# Patient Record
Sex: MALE | Race: WHITE | NOT HISPANIC OR LATINO
[De-identification: names, ages, dates, MRNs, and addresses within clinical notes are randomized per-mention and may not be internally consistent; named-entity substitution may affect disease eponyms.]

---

## 2017-05-01 ENCOUNTER — LAB SERVICES (OUTPATIENT)
Dept: OTHER | Age: 37
End: 2017-05-01

## 2017-05-01 LAB
KETONES: NEGATIVE
Lab: <0.01 G/DL
SALICYLATE LEVEL: <1 MG/DL

## 2017-05-02 ENCOUNTER — CHARTING TRANS (OUTPATIENT)
Dept: OTHER | Age: 37
End: 2017-05-02

## 2017-05-03 ENCOUNTER — CHARTING TRANS (OUTPATIENT)
Dept: OTHER | Age: 37
End: 2017-05-03

## 2017-05-04 ENCOUNTER — LAB SERVICES (OUTPATIENT)
Dept: OTHER | Age: 37
End: 2017-05-04

## 2017-05-05 ENCOUNTER — CHARTING TRANS (OUTPATIENT)
Dept: OTHER | Age: 37
End: 2017-05-05

## 2017-05-06 ENCOUNTER — CHARTING TRANS (OUTPATIENT)
Dept: OTHER | Age: 37
End: 2017-05-06

## 2017-05-06 ENCOUNTER — LAB SERVICES (OUTPATIENT)
Dept: OTHER | Age: 37
End: 2017-05-06

## 2017-05-07 ENCOUNTER — CHARTING TRANS (OUTPATIENT)
Dept: OTHER | Age: 37
End: 2017-05-07

## 2017-05-07 LAB — CLOSTRIDIUM DIFFICILE TOXIN PCR: NORMAL

## 2017-05-08 LAB
CULTURE CSF WITH GRAM: NORMAL
CULTURE STOOL: NORMAL

## 2017-05-10 LAB — APPEARANCE SPEC: NORMAL

## 2017-05-12 ENCOUNTER — NURSE ONLY (OUTPATIENT)
Dept: LAB | Age: 37
End: 2017-05-12
Attending: FAMILY MEDICINE
Payer: MEDICAID

## 2017-05-12 DIAGNOSIS — E87.6 HYPOKALEMIA: Primary | ICD-10-CM

## 2017-05-12 PROCEDURE — 80053 COMPREHEN METABOLIC PANEL: CPT

## 2017-05-12 PROCEDURE — 85025 COMPLETE CBC W/AUTO DIFF WBC: CPT

## 2017-05-12 PROCEDURE — 36415 COLL VENOUS BLD VENIPUNCTURE: CPT

## 2017-05-13 ENCOUNTER — LAB ENCOUNTER (OUTPATIENT)
Dept: LAB | Age: 37
End: 2017-05-13
Attending: FAMILY MEDICINE
Payer: MEDICAID

## 2017-05-13 ENCOUNTER — HOSPITAL ENCOUNTER (INPATIENT)
Facility: HOSPITAL | Age: 37
LOS: 5 days | Discharge: NURSING, REHAB OR LONG-TERM CARE | DRG: 392 | End: 2017-05-18
Attending: STUDENT IN AN ORGANIZED HEALTH CARE EDUCATION/TRAINING PROGRAM | Admitting: HOSPITALIST
Payer: MEDICAID

## 2017-05-13 ENCOUNTER — APPOINTMENT (OUTPATIENT)
Dept: CT IMAGING | Facility: HOSPITAL | Age: 37
DRG: 392 | End: 2017-05-13
Attending: STUDENT IN AN ORGANIZED HEALTH CARE EDUCATION/TRAINING PROGRAM
Payer: MEDICAID

## 2017-05-13 ENCOUNTER — APPOINTMENT (OUTPATIENT)
Dept: ULTRASOUND IMAGING | Facility: HOSPITAL | Age: 37
DRG: 392 | End: 2017-05-13
Attending: STUDENT IN AN ORGANIZED HEALTH CARE EDUCATION/TRAINING PROGRAM
Payer: MEDICAID

## 2017-05-13 DIAGNOSIS — D72.829 LEUKOCYTOSIS, UNSPECIFIED TYPE: ICD-10-CM

## 2017-05-13 DIAGNOSIS — R10.9 ABDOMINAL PAIN, ACUTE: Primary | ICD-10-CM

## 2017-05-13 DIAGNOSIS — G61.0 GUILLAIN BARRÉ SYNDROME (HCC): ICD-10-CM

## 2017-05-13 DIAGNOSIS — M79.2 NEUROPATHIC PAIN: ICD-10-CM

## 2017-05-13 DIAGNOSIS — R74.01 TRANSAMINITIS: ICD-10-CM

## 2017-05-13 DIAGNOSIS — I10 HTN (HYPERTENSION): Primary | ICD-10-CM

## 2017-05-13 DIAGNOSIS — R14.0 ABDOMINAL DISTENTION: ICD-10-CM

## 2017-05-13 DIAGNOSIS — R11.2 INTRACTABLE VOMITING WITH NAUSEA, UNSPECIFIED VOMITING TYPE: ICD-10-CM

## 2017-05-13 PROBLEM — N17.9 ACUTE KIDNEY INJURY (HCC): Status: ACTIVE | Noted: 2017-05-13

## 2017-05-13 PROBLEM — Z91.81 AT RISK FOR FALLING: Status: ACTIVE | Noted: 2017-05-13

## 2017-05-13 PROBLEM — E87.6 HYPOKALEMIA: Status: ACTIVE | Noted: 2017-05-13

## 2017-05-13 PROBLEM — R73.9 HYPERGLYCEMIA: Status: ACTIVE | Noted: 2017-05-13

## 2017-05-13 PROBLEM — D64.9 ANEMIA: Status: ACTIVE | Noted: 2017-05-13

## 2017-05-13 PROCEDURE — 36415 COLL VENOUS BLD VENIPUNCTURE: CPT

## 2017-05-13 PROCEDURE — 74177 CT ABD & PELVIS W/CONTRAST: CPT | Performed by: STUDENT IN AN ORGANIZED HEALTH CARE EDUCATION/TRAINING PROGRAM

## 2017-05-13 PROCEDURE — 85025 COMPLETE CBC W/AUTO DIFF WBC: CPT

## 2017-05-13 PROCEDURE — 80053 COMPREHEN METABOLIC PANEL: CPT

## 2017-05-13 PROCEDURE — 84300 ASSAY OF URINE SODIUM: CPT | Performed by: INTERNAL MEDICINE

## 2017-05-13 PROCEDURE — 76700 US EXAM ABDOM COMPLETE: CPT | Performed by: STUDENT IN AN ORGANIZED HEALTH CARE EDUCATION/TRAINING PROGRAM

## 2017-05-13 PROCEDURE — 99223 1ST HOSP IP/OBS HIGH 75: CPT | Performed by: INTERNAL MEDICINE

## 2017-05-13 RX ORDER — SODIUM CHLORIDE 9 MG/ML
INJECTION, SOLUTION INTRAVENOUS CONTINUOUS
Status: DISCONTINUED | OUTPATIENT
Start: 2017-05-13 | End: 2017-05-18

## 2017-05-13 RX ORDER — MORPHINE SULFATE 2 MG/ML
2 INJECTION, SOLUTION INTRAMUSCULAR; INTRAVENOUS EVERY 2 HOUR PRN
Status: DISCONTINUED | OUTPATIENT
Start: 2017-05-13 | End: 2017-05-17

## 2017-05-13 RX ORDER — DIPHENHYDRAMINE HYDROCHLORIDE 50 MG/ML
25 INJECTION INTRAMUSCULAR; INTRAVENOUS ONCE
Status: COMPLETED | OUTPATIENT
Start: 2017-05-13 | End: 2017-05-13

## 2017-05-13 RX ORDER — HYDROMORPHONE HYDROCHLORIDE 1 MG/ML
0.5 INJECTION, SOLUTION INTRAMUSCULAR; INTRAVENOUS; SUBCUTANEOUS ONCE
Status: COMPLETED | OUTPATIENT
Start: 2017-05-13 | End: 2017-05-13

## 2017-05-13 RX ORDER — METOCLOPRAMIDE HYDROCHLORIDE 5 MG/ML
10 INJECTION INTRAMUSCULAR; INTRAVENOUS ONCE
Status: COMPLETED | OUTPATIENT
Start: 2017-05-13 | End: 2017-05-13

## 2017-05-13 RX ORDER — MORPHINE SULFATE 4 MG/ML
4 INJECTION, SOLUTION INTRAMUSCULAR; INTRAVENOUS EVERY 2 HOUR PRN
Status: DISCONTINUED | OUTPATIENT
Start: 2017-05-13 | End: 2017-05-17

## 2017-05-13 RX ORDER — ONDANSETRON 2 MG/ML
4 INJECTION INTRAMUSCULAR; INTRAVENOUS EVERY 4 HOURS PRN
Status: CANCELLED | OUTPATIENT
Start: 2017-05-13

## 2017-05-13 RX ORDER — ONDANSETRON 2 MG/ML
4 INJECTION INTRAMUSCULAR; INTRAVENOUS ONCE
Status: COMPLETED | OUTPATIENT
Start: 2017-05-13 | End: 2017-05-13

## 2017-05-13 RX ORDER — GABAPENTIN 300 MG/1
600 CAPSULE ORAL 3 TIMES DAILY
Status: ON HOLD | COMMUNITY
End: 2017-05-18

## 2017-05-13 RX ORDER — MORPHINE SULFATE 4 MG/ML
4 INJECTION, SOLUTION INTRAMUSCULAR; INTRAVENOUS ONCE
Status: COMPLETED | OUTPATIENT
Start: 2017-05-13 | End: 2017-05-13

## 2017-05-13 RX ORDER — ONDANSETRON 2 MG/ML
4 INJECTION INTRAMUSCULAR; INTRAVENOUS EVERY 6 HOURS PRN
Status: DISCONTINUED | OUTPATIENT
Start: 2017-05-13 | End: 2017-05-18

## 2017-05-13 RX ORDER — SODIUM CHLORIDE 9 MG/ML
INJECTION, SOLUTION INTRAVENOUS CONTINUOUS
Status: CANCELLED | OUTPATIENT
Start: 2017-05-13 | End: 2017-05-13

## 2017-05-13 RX ORDER — MORPHINE SULFATE 2 MG/ML
1 INJECTION, SOLUTION INTRAMUSCULAR; INTRAVENOUS EVERY 2 HOUR PRN
Status: DISCONTINUED | OUTPATIENT
Start: 2017-05-13 | End: 2017-05-17

## 2017-05-13 RX ORDER — SODIUM CHLORIDE 9 MG/ML
1000 INJECTION, SOLUTION INTRAVENOUS ONCE
Status: COMPLETED | OUTPATIENT
Start: 2017-05-13 | End: 2017-05-13

## 2017-05-13 RX ORDER — HYDROCODONE BITARTRATE AND ACETAMINOPHEN 7.5; 325 MG/1; MG/1
1 TABLET ORAL EVERY 4 HOURS PRN
Status: ON HOLD | COMMUNITY
End: 2017-05-18

## 2017-05-13 RX ORDER — SIMETHICONE 80 MG
80 TABLET,CHEWABLE ORAL EVERY 6 HOURS PRN
Status: ON HOLD | COMMUNITY
End: 2017-05-18

## 2017-05-13 RX ORDER — ENOXAPARIN SODIUM 100 MG/ML
40 INJECTION SUBCUTANEOUS NIGHTLY
Status: DISCONTINUED | OUTPATIENT
Start: 2017-05-13 | End: 2017-05-18

## 2017-05-13 RX ORDER — NICOTINE 21 MG/24HR
PATCH, TRANSDERMAL 24 HOURS TRANSDERMAL EVERY 24 HOURS
Status: ON HOLD | COMMUNITY
End: 2017-05-18

## 2017-05-13 RX ORDER — HYDROMORPHONE HYDROCHLORIDE 1 MG/ML
0.5 INJECTION, SOLUTION INTRAMUSCULAR; INTRAVENOUS; SUBCUTANEOUS EVERY 30 MIN PRN
Status: CANCELLED | OUTPATIENT
Start: 2017-05-13 | End: 2017-05-13

## 2017-05-13 RX ORDER — FAMOTIDINE 20 MG/1
20 TABLET ORAL 2 TIMES DAILY
Status: ON HOLD | COMMUNITY
End: 2017-05-18

## 2017-05-13 NOTE — ED PROVIDER NOTES
Patient Seen in: [facility] Emergency Department    History   Patient presents with:  Abdomen/Flank Pain (GI/)    Stated Complaint: ABD PAIN/VOMITING    HPI    Patient is a 54-year-old male with previous history of Guillain-Barré syndrome currentl 1701 137   Resp 05/13/17 1701 24   Temp 05/13/17 1701 97 °F (36.1 °C)   Temp src 05/13/17 1701 Temporal   SpO2 05/13/17 1701 98 %   O2 Device 05/13/17 1701 None (Room air)       Current:/95 mmHg  Pulse 130  Temp(Src) 97 °F (36.1 °C) (Temporal)  Resp DIFFERENTIAL - Abnormal; Notable for the following:     WBC 19.5 (*)     RBC 3.21 (*)     HGB 11.2 (*)     HCT 32.2 (*)     .3 (*)     MCH 34.9 (*)     RDW-SD 51.3 (*)     Neutrophil Absolute Prelim 16.00 (*)     Neutrophil Absolute 16.00 (*)     [facility]    Disposition:  Admit    Follow-up:  No follow-up provider specified.     Medications Prescribed:  Current Discharge Medication List        Present on Admission  Date Reviewed: 10/12/2014          ICD-10-CM Noted POA    Abdominal pain, acute R10.9 5/

## 2017-05-14 PROBLEM — R29.898 BILATERAL LEG WEAKNESS: Status: ACTIVE | Noted: 2017-05-14

## 2017-05-14 PROCEDURE — 99223 1ST HOSP IP/OBS HIGH 75: CPT | Performed by: OTHER

## 2017-05-14 PROCEDURE — 30233S1 TRANSFUSION OF NONAUTOLOGOUS GLOBULIN INTO PERIPHERAL VEIN, PERCUTANEOUS APPROACH: ICD-10-PCS | Performed by: HOSPITALIST

## 2017-05-14 RX ORDER — POTASSIUM CHLORIDE 14.9 MG/ML
20 INJECTION INTRAVENOUS ONCE
Status: COMPLETED | OUTPATIENT
Start: 2017-05-14 | End: 2017-05-14

## 2017-05-14 RX ORDER — POTASSIUM CHLORIDE 14.9 MG/ML
20 INJECTION INTRAVENOUS ONCE
Status: COMPLETED | OUTPATIENT
Start: 2017-05-15 | End: 2017-05-15

## 2017-05-14 NOTE — CONSULTS
[facility]    Report of Consultation    [facility] Patient Status:  Inpatient    1980 MRN [id number]   [facility] 3NW-A Attending Tahira Valencia, [address] [facility] Day # 1 PCP No primary care provider on file.      Date of Admission: smokeless tobacco history on file. He reports that he uses illicit drugs (Cannabis) about 3 times per week.     Allergies:    Ibuprofen               Rash    Medications:    Current facility-administered medications:   •  potassium chloride 40 mEq in sodium Strength in UE intact almost 5/5. Strength in LE 2-3/5. DTR: absent throughout.  Co-ordination: severely impaired finger to nose test  Gait: deferred      Diagnostic Data:   Lab Results  Component Value Date    05/14/2017   K 2.8 05/14/2017    0

## 2017-05-14 NOTE — PROGRESS NOTES
Dr. Lashaun Vega paged for critical potassium. Waiting for response. Dr. Izzy Quach paged for critical potassium. Waiting for response.

## 2017-05-14 NOTE — PROGRESS NOTES
Dr. Rebecca Lemos paged for questions about orders. Waiting for response. 0007: Orders clarified: Reports to watch patient sip water to make sure patient can swallow and not cough-if able, may give ice chips.  Also reports to check a MRSA swab x3 24 hours apar

## 2017-05-14 NOTE — H&P
[facility]  History and Physical     [facility] Patient Status:  Inpatient    1980 MRN [id number]   [facility] 3NW-A Attending Robin Villalobos MD   Hosp Day # 1 PCP No primary care provider on file.      Chief Complain COLONOSCOPY & POLYPECTOMY         Social History:  reports that he has been smoking Cigarettes. He has been smoking about 0.50 packs per day. He does not have any smokeless tobacco history on file.  He reports that he uses illicit drugs (Cannabis) about 3 Labs:  Recent Labs   Lab  05/12/17   0830  05/13/17   0615  05/13/17   1708   WBC  9.7  14.2*  19.5*   HGB  10.2*  10.5*  11.2*   MCV  103.0*  103.0*  100.3*   PLT  183.0  242.0  252.0       Recent Labs   Lab  05/12/17   0830  05/13/17 0615 05/13/17

## 2017-05-14 NOTE — SLP NOTE
Order received for swallow eval.  Pt currently NPO except ice chips. RN, Leopoldo Holm reported pt tolerating ice chips without clinical s/s aspiration.   Due to NPO status, will await GI consult before proceeding with po trials/swallow eval.  RN verbalized agreeme

## 2017-05-14 NOTE — PLAN OF CARE
METABOLIC/FLUID AND ELECTROLYTES - ADULT    • Electrolytes maintained within normal limits Not Progressing          GASTROINTESTINAL - ADULT    • Minimal or absence of nausea and vomiting Progressing    • Maintains or returns to baseline bowel function Pro

## 2017-05-14 NOTE — PROGRESS NOTES
[facility]  Progress Note     Oriana Gaona Patient Status:  Inpatient    1980 MRN [id number]   [facility] 3NW-A Attending Blake Heard, [address] [facility] Day # 1 PCP No primary care provider on file.      Chief Complaint: N/V    S: hours. No results for input(s): TROP, CK in the last 72 hours. Imaging: Imaging data reviewed in Epic.     Medications:   • potassium chloride 40mEq IVPB (peripheral line)  40 mEq Intravenous Once    Followed by   • potassium chloride  20 mEq Int

## 2017-05-14 NOTE — PROGRESS NOTES
Dr. Octavia Garvey paged for new consult. Waiting for response    0710: Aware of consult. No new orders received.

## 2017-05-14 NOTE — PROGRESS NOTES
Lab paged for blood cultures to be drawn. Waiting for response. 2430: Lab in to draw blood cultures.

## 2017-05-14 NOTE — CONSULTS
[facility]    Gastroenterology Initial Consultation    [facility] Patient Status:  Inpatient    1980 MRN [id number]   [facility] 3NW-A Attending Tahira Valencia, [address] [facility] [address] Day # 1 PCP No primary care provider on file.        Ede Rodriguez week.    Allergies:    Ibuprofen               Rash    Medications:    Current facility-administered medications:   •  potassium chloride 40 mEq in sodium chloride 0.9 % 250 mL IVPB, 40 mEq, Intravenous, Once **FOLLOWED BY** potassium chloride IVPB premix excessive bleeding, enlarging or painful lymph nodes. Allergy: Denies latex/rubber allergy, anaphylactic or other reaction to anesthesia, food allergy. Eyes: Denies blurred/double vision, eye disease, glasses or contacts, glaucoma.   ENT: Denies nose or neurology  *Will trial clears however if cannot tolerate, will need to keep NPO  *Do not suspect leukocytosis to be related to bowel infection as findings are minimal on CT    Elevated Liver Enzymes  *Likely due to fatty liver as seen on US  *No further wo

## 2017-05-14 NOTE — PROGRESS NOTES
05/14/17 1459   Clinical Encounter Type   Visited With Patient not available   Continue Visiting Yes

## 2017-05-15 ENCOUNTER — APPOINTMENT (OUTPATIENT)
Dept: GENERAL RADIOLOGY | Facility: HOSPITAL | Age: 37
DRG: 392 | End: 2017-05-15
Attending: INTERNAL MEDICINE
Payer: MEDICAID

## 2017-05-15 PROCEDURE — 99233 SBSQ HOSP IP/OBS HIGH 50: CPT | Performed by: OTHER

## 2017-05-15 PROCEDURE — 99232 SBSQ HOSP IP/OBS MODERATE 35: CPT | Performed by: HOSPITALIST

## 2017-05-15 PROCEDURE — 05HD33Z INSERTION OF INFUSION DEVICE INTO RIGHT CEPHALIC VEIN, PERCUTANEOUS APPROACH: ICD-10-PCS | Performed by: HOSPITALIST

## 2017-05-15 PROCEDURE — B54MZZA ULTRASONOGRAPHY OF RIGHT UPPER EXTREMITY VEINS, GUIDANCE: ICD-10-PCS | Performed by: HOSPITALIST

## 2017-05-15 PROCEDURE — 74020 XR ABDOMEN, OBSTRUCTIVE SERIES (CPT=74020): CPT | Performed by: INTERNAL MEDICINE

## 2017-05-15 RX ORDER — POTASSIUM CHLORIDE 20 MEQ/1
40 TABLET, EXTENDED RELEASE ORAL ONCE
Status: COMPLETED | OUTPATIENT
Start: 2017-05-15 | End: 2017-05-15

## 2017-05-15 RX ORDER — SODIUM CHLORIDE 0.9 % (FLUSH) 0.9 %
10 SYRINGE (ML) INJECTION EVERY 12 HOURS
Status: DISCONTINUED | OUTPATIENT
Start: 2017-05-15 | End: 2017-05-18

## 2017-05-15 RX ORDER — FOLIC ACID 1 MG/1
1 TABLET ORAL DAILY
Status: DISCONTINUED | OUTPATIENT
Start: 2017-05-15 | End: 2017-05-17

## 2017-05-15 NOTE — OCCUPATIONAL THERAPY NOTE
OCCUPATIONAL THERAPY EVALUATION - INPATIENT     Room Number: 322/322-A  Evaluation Date: 5/15/2017  Type of Evaluation: Initial  Presenting Problem: abd pain/distension, leukocytosis, ileus and transaminitis     Physician Order: IP Consult to Occupational Leukocytosis    Acute kidney injury ([facility] [address]    Abdominal distention    Intractable vomiting with nausea, unspecified vomiting type    Leukocytosis, unspecified type    Transaminitis    Guillain Barré syndrome (HCC)    At risk for falling    Bilateral leg wea Modified Barthel Index score of 0/20; <15 usually indicates moderate disability; <10 usually indicates severe disability in ADL dysfunction in the areas of: bowel and bladder management, grooming, toileting, feeding, functional transfers, funct male admitted 5/13/2017 for abd pain/distension, leukocytosis, ileus and transaminitis.   In this OT evaluation patient presents with the following impairments: pain, numbness, BADL/IADL dysfunction, decreased endurance, balance, strength, ROM and functiona

## 2017-05-15 NOTE — PLAN OF CARE
GASTROINTESTINAL - ADULT    • Minimal or absence of nausea and vomiting Progressing    • Maintains or returns to baseline bowel function Progressing          METABOLIC/FLUID AND ELECTROLYTES - ADULT    • Electrolytes maintained within normal limits Progres

## 2017-05-15 NOTE — PROGRESS NOTES
[facility]  Progress Note     Robyn Lopez Patient Status:  Inpatient    1980 MRN [id number]   [facility] 3NW-A Attending Sabrina Silverio, [address] [facility] Day # 2 PCP No primary care provider on file.      Chief Complaint: N/V    S: Estimated Creatinine Clearance: 157.4 mL/min (based on Cr of 0.58). No results for input(s): PTP, INR in the last 72 hours. No results for input(s): TROP, CK in the last 72 hours. Imaging: Imaging data reviewed in Epic.     Medications:

## 2017-05-15 NOTE — CM/SW NOTE
VM message from Valeria Lance with [facility]. Pt admitted to [facility] from [facility]. Valeria Lance states she is aware pt is \"Homeless\" and the facility is willing to accept him back, if pt desires, when he is ready for discharge from [facility].      Valeria Lance states that pt w

## 2017-05-15 NOTE — PAYOR COMM NOTE
Attending Physician: Zenia Kuo DO    Review Type: ADMISSION   Reviewer: Shaniqua Redd       Date: May 15, 2017 - 8:57 AM  Payor: MEDICAID  Authorization Number: [id number]/A  Admit date: 5/13/2017  4:52 PM   Admitted from Emergency Dept.:yes     H&P by Gideon Syed, body feels numb from his shoulders down. His family also notes problems with coordination. He denies recent difficulty voiding, dysuria, headache. Denies constipation after discharge.  Mother says that he was vomiting for weeks prior to admission to [facility] deformity. Abdomen: Soft. Significantly distended. Nontender to palpation. No rebound, guarding or organomegaly. Hypoactive bowel sounds. Tympanic to percussion. Neurologic: CNII-XII grossly intact. RLE 4/5 strength, LLE 5/5, LUE 5/5, RUE 5/5.  Decreased CRP  4. Leukocytosis  1. UA with possible UTI although patient denying symptoms. Will order blood cultures. Follow up urine culture, will treat if positive. 5. Anemia  6. DERIC  1. Monitor creatinine with IV fluids  2. Urine Na  7.  Abnormal LFTs  1. RUQ US sodium chloride 0.9 % 250 mL IVPB     Date Action Dose Route User    5/14/2017 2300 New Bag 40 mEq Intravenous Lourdes Kaminski, RN          RESULTS LAST 24HRS:  Labs Reviewed   COMP METABOLIC PANEL (14) - Abnormal; Notable for the following:     Glucose 11 DIFFERENTIAL - Abnormal; Notable for the following:     WBC 14.2 (*)     RBC 2.54 (*)     HGB 8.9 (*)     HCT 26.2 (*)     .1 (*)     MCH 35.0 (*)     RDW-SD 53.0 (*)     Neutrophil Absolute Prelim 10.97 (*)     Neutrophil Absolute 10.97 (*)     Mon

## 2017-05-15 NOTE — PROGRESS NOTES
Gastroenterology Progress Note  Patient Name: Cipriano Whitehead  Chief Complaint: Abdominal distention  S: The patient reports continued distention of the abdomen, but passing flatus, and feels that he needs to have a BM.  O: /93 mmHg  Pulse 112  Temp

## 2017-05-15 NOTE — PLAN OF CARE
GASTROINTESTINAL - ADULT    • Minimal or absence of nausea and vomiting Progressing    • Maintains or returns to baseline bowel function Progressing        Impaired Activities of Daily Living    • Achieve highest/safest level of independence in self care P

## 2017-05-15 NOTE — PROCEDURES
[address]  Shadi Hutchins 12  [address] [facility], [address] [place] [address]      PATIENT'S NAME: Tara Godwin   REFERRING PHYSICIAN: Ashlyn Jaquez.  Chuck Busby ACCOUNT #: [de-identified] LOCATION: [facility]   MEDICAL RECORD #: [id number] DATE OF BIR Inc.Dur.  Lt med gastroc   Inc.Ir.  1+   1+  None  Normal   Red. No. Inc.Dur.  Lt ext amrita       Inc.Ir.  1+   1+  None  Normal   Red. No. Inc.Dur.  Lt abd saul brev  Inc.Ir.  1+   1+  None  Normal   Red. No. Inc.Dur.  Lt abd dig min   Inc.Ir.  1+   1+  None  N conduction velocity and possible normal sensory nerve conduction, the findings are not classic for Guillain-Cost syndrome. Clinical correlation is required.     Dictated By Jacinta Gomez M.D.  d:   05/15/2017 13:44:44  t:   05/15/2017 14:17:57  Job

## 2017-05-15 NOTE — PROGRESS NOTES
05/15/17 1346   Clinical Encounter Type   Visited With Patient and family together  ( spoke with pts sister . She was crying outside  pts room.  encouraged her.   She told  it was good to be addressed and speak to someone about h

## 2017-05-15 NOTE — PHYSICAL THERAPY NOTE
PHYSICAL THERAPY EVALUATION - INPATIENT     Room Number: 322/322-A  Evaluation Date: 5/15/2017  Type of Evaluation: Initial  Physician Order: PT Eval and Treat    Presenting Problem: abdominal pain  Reason for Therapy: Mobility Dysfunction and Discharg limits    RANGE OF MOTION AND STRENGTH ASSESSMENT  See OT eval for assessment of the B UE's    Lower extremity ROM is within functional limits except for the following:   Full ROM, however mild tone present B LE's    Lower extremity strength is within func secondary to the pain. Pt cued for proper breathing technique and calming strategies. Pt states he is unable to feel the LE's while sitting EOB and doesn't feel where they are in space.   Pt with increased BP in sitting EOB, therefore, was assisted back in is able to ambulate feet with assist device: walker - rolling at assistance level: not tested     Goal #4 PT to assess transfers, ambulation, standardized tests, coordination.     Goal #5    Goal #6    Goal Comments: Goals established on 5/15/2017

## 2017-05-15 NOTE — SLP NOTE
ADULT SWALLOWING EVALUATION    ASSESSMENT & PLAN   ASSESSMENT  Order received for bedside swallow evaluation.   Per chart review:    History of Present Illness: Richard Ho is a 39year old male with a past medical history of recently diagnosed [place] retrieval & containment with timely mastication & transit; no observed oral residue. Pharyngeal response appeared timely with hyolaryngeal elevation palpated and clear vocal quality following all po trials.   No overt clinical s/s of aspiration noted or re Motion: Within Functional Limits    Voice Quality: Clear  Respiratory Status: Unlabored  Consistencies Trialed:  Thin liquids;Puree;Hard solid  Method of Presentation: Staff/Clinician assistance (pt unable to feed self d/t motor weakness)  Patient Santi Hornes

## 2017-05-15 NOTE — CM/SW NOTE
05/15/17 1400   CM/SW Referral Data   Referral Source Physician;Social Work (self-referral)   Reason for Referral Discharge planning   Informant Patient  (Mother, sister)   Patient Info   Patient's Mental Status Alert;Oriented   Patient's Home Environme

## 2017-05-15 NOTE — PROGRESS NOTES
[address]  Neurology Progress Note    Kamithor Dewey Patient Status:  Inpatient    1980 MRN [id number]   [facility] 3NW-A Attending Uche Del Valle, [address] [facility] Day # 2 PCP No primary care provider on file.          Subjective: 5/5 strength throughout, uncontrollable movement in arms, can not keep arms still to assess drift  BLE: HF 3/5, KE/KF 4/5, PF/DF 5/5  Sensory: per pt report absent sensation from collar bone down including legs arms, upon testing able to sense to light iam following recommending TAD.       Dr. Renny Wheeler to follow      RUSSEL Pollard  5/15/2017  8:47 AM  [facility] 8457      Neurology Attending Addendum:  I have seen the patient independently, reviewed the history, labs and imagi po  -started on IVIG, today Day 2, can treat for total of 1g/kg   -would switch Norco or any other narcotics to gabapentin, will start  -PT and OT    Odilon Estrada, DO  Neurology and Neuromuscular medicine  [facility]  pager [phone]

## 2017-05-16 PROCEDURE — 99233 SBSQ HOSP IP/OBS HIGH 50: CPT | Performed by: OTHER

## 2017-05-16 PROCEDURE — 99232 SBSQ HOSP IP/OBS MODERATE 35: CPT | Performed by: HOSPITALIST

## 2017-05-16 RX ORDER — GABAPENTIN 300 MG/1
600 CAPSULE ORAL 3 TIMES DAILY
Status: DISCONTINUED | OUTPATIENT
Start: 2017-05-17 | End: 2017-05-17

## 2017-05-16 RX ORDER — METOCLOPRAMIDE HYDROCHLORIDE 5 MG/ML
10 INJECTION INTRAMUSCULAR; INTRAVENOUS EVERY 6 HOURS
Status: DISCONTINUED | OUTPATIENT
Start: 2017-05-16 | End: 2017-05-17

## 2017-05-16 RX ORDER — KETOROLAC TROMETHAMINE 30 MG/ML
30 INJECTION, SOLUTION INTRAMUSCULAR; INTRAVENOUS EVERY 6 HOURS PRN
Status: DISPENSED | OUTPATIENT
Start: 2017-05-16 | End: 2017-05-18

## 2017-05-16 RX ORDER — GABAPENTIN 300 MG/1
300 CAPSULE ORAL 2 TIMES DAILY
Status: DISCONTINUED | OUTPATIENT
Start: 2017-05-16 | End: 2017-05-16

## 2017-05-16 RX ORDER — GABAPENTIN 300 MG/1
900 CAPSULE ORAL ONCE
Status: COMPLETED | OUTPATIENT
Start: 2017-05-16 | End: 2017-05-16

## 2017-05-16 RX ORDER — GABAPENTIN 300 MG/1
300 CAPSULE ORAL ONCE
Status: COMPLETED | OUTPATIENT
Start: 2017-05-16 | End: 2017-05-16

## 2017-05-16 NOTE — PROGRESS NOTES
[facility]  Progress Note     Koki Wood Patient Status:  Inpatient    1980 MRN [id number]   [facility] 3NW-A Attending Fabiola Lewis, [address] [facility] Day # 3 PCP No primary care provider on file.      Chief Complaint: N/V    S: Estimated Creatinine Clearance: 157.4 mL/min (based on Cr of 0.58). No results for input(s): PTP, INR in the last 72 hours. No results for input(s): TROP, CK in the last 72 hours. Imaging: Imaging data reviewed in Epic.     Medications:

## 2017-05-16 NOTE — PROGRESS NOTES
Gastroenterology Progress Note  Patient Name: Pastora Guerra  Chief Complaint: Guillain-[place] syndrome with ileus / abdominal distention  S: The patient reports continued abdominal distention.   However, he has no abdominal pain, and has been passing flat

## 2017-05-16 NOTE — SLP NOTE
Attempted to see patient for dysphagia therapy/follow-up with meal however patient currently strict NPO at this time. Will re-attempt as available and appropriate.

## 2017-05-16 NOTE — PROGRESS NOTES
Patient reports morphine is not helping his pain. Patient is requesting a medication to help with his muscle spasms and to ease his nerves. Dr. Santi Aviles paged-waiting for response. 0710: New orders received. Will implement.      1015: Patient has oral m

## 2017-05-16 NOTE — PROGRESS NOTES
Patient has a yeast like infection to scrotum area. Paged Dr. Frida Chandra, waiting for response. 234: New orders received. Will implement.

## 2017-05-16 NOTE — SLP NOTE
SPEECH DAILY NOTE - INPATIENT    Evaluation Date: 05/16/2017    ASSESSMENT & PLAN   ASSESSMENT  Pt seen for dysphagia tx to assess tolerance with recommended diet, ensure appropriate utilization of aspiration precautions and provide pt/family education.   Simon Nunez patient/family/caregiver will demonstrate understanding and implementation of aspiration precautions and swallow strategies independently over 1-2 session(s). In progress    Goal #3  The patient will utilize compensatory strategies as outlined by  BSSE (cli

## 2017-05-16 NOTE — PROGRESS NOTES
Very restless and uncontrolled movements , and states feels pain 9/10, notified md and received new order , limit use of narcotic

## 2017-05-16 NOTE — PROGRESS NOTES
[id number] Sarika Garcia Neurology Progress Note    Pastora Guerra Patient Status:  Inpatient    1980 MRN [id number]   [facility] 3NW-A Attending Catie Caballero, [address] [facility] Day # 3 PCP No primary care provider on file.      Chief Complaint: all four limbs (sister feels it is better today)   Romberg: absent  Gait: deferred    Labs:    Lab Results  Component Value Date   HCT 27.0 05/15/2017   K 4.8 05/16/2017     Imaging:  EMG 5/15/2017   IMPRESSION:    Nerve conduction studies reveal the sural confusion is improved today, and he thinks his vision is less blurry  O: /86 mmHg  Pulse 113  Temp(Src) 97.6 °F (36.4 °C) (Oral)  Resp 18  Ht 167.6 cm (5' 6\")  Wt 179 lb 8 oz (81.421 kg)  BMI 28.99 kg/m2  SpO2 97%  Neuro exam pertinent for improved

## 2017-05-17 ENCOUNTER — APPOINTMENT (OUTPATIENT)
Dept: GENERAL RADIOLOGY | Facility: HOSPITAL | Age: 37
DRG: 392 | End: 2017-05-17
Attending: INTERNAL MEDICINE
Payer: MEDICAID

## 2017-05-17 PROCEDURE — 74020 XR ABDOMEN, OBSTRUCTIVE SERIES (CPT=74020): CPT | Performed by: INTERNAL MEDICINE

## 2017-05-17 PROCEDURE — 99233 SBSQ HOSP IP/OBS HIGH 50: CPT | Performed by: OTHER

## 2017-05-17 PROCEDURE — 99232 SBSQ HOSP IP/OBS MODERATE 35: CPT | Performed by: HOSPITALIST

## 2017-05-17 RX ORDER — TRAMADOL HYDROCHLORIDE 50 MG/1
50 TABLET ORAL EVERY 6 HOURS PRN
Status: DISCONTINUED | OUTPATIENT
Start: 2017-05-17 | End: 2017-05-18

## 2017-05-17 RX ORDER — GABAPENTIN 300 MG/1
900 CAPSULE ORAL 3 TIMES DAILY
Status: DISCONTINUED | OUTPATIENT
Start: 2017-05-17 | End: 2017-05-18

## 2017-05-17 RX ORDER — DULOXETIN HYDROCHLORIDE 20 MG/1
40 CAPSULE, DELAYED RELEASE ORAL DAILY
Status: DISCONTINUED | OUTPATIENT
Start: 2017-05-17 | End: 2017-05-18

## 2017-05-17 NOTE — PROGRESS NOTES
[facility]  Progress Note     Leila Bar Patient Status:  Inpatient    1980 MRN [id number]   [facility] 3NW-A Attending Cristy Ladd, [address] [facility] Day # 4 PCP No primary care provider on file.      Chief Complaint: N/V    S: Ileus suspected 2/2 autonomic dysfunction from Guillain [place] Syndrome   1. Resolved  2. D/c ATC reglan  3. Diet per GI  4. IV fluids  2. 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 Neurology following  2.  Completed 5 days of IVIG at [facility]

## 2017-05-17 NOTE — SLP NOTE
SPEECH DAILY NOTE - INPATIENT    Evaluation Date: 05/17/2017    ASSESSMENT & PLAN   ASSESSMENT  Pt was seen for continued dysphagia tx to assess with diet tolerance/safety or recommended diet, r/o aspiration risk, and ensure that pt and staff are f/u with bites, Small sips, Alternate liquids/solids, Upright 90 degrees 30 mins after meal with minimal assistance 95 % of the time across 2 sessions.                          FOLLOW UP  Follow Up Needed: Yes  SLP Follow-up Date: 05/17/17  Number of Visits to Meet

## 2017-05-17 NOTE — PLAN OF CARE
PAIN - ADULT    • Verbalizes/displays adequate comfort level or patient's stated pain goal Progressing          NEUROLOGICAL - ADULT    • Achieves stable or improved neurological status Progressing          Impaired Functional Mobility    • Achieve highest

## 2017-05-17 NOTE — PROGRESS NOTES
Gastroenterology Progress Note  Patient Name: [facility]  Chief Complaint: Neurogenic bowel  S: The patient reports that he is feeling better today. His abdomen remains distended, but feels less so.   He is passing flatus consistently, and is having

## 2017-05-17 NOTE — BH PROGRESS NOTE
[address] [facility] Resource Referral Counselor Note    Shalini Pond Patient Status:  Inpatient    1980 MRN [id number]   [facility] 3NW-A Attending Juan Alonzo MD   Hosp Day # 4 PCP No primary care provider on file.        S(sub

## 2017-05-17 NOTE — PROGRESS NOTES
[address] Sarika Garcia Neurology Progress Note    Augustus Cross Patient Status:  Inpatient    1980 MRN [id number]   [facility] 3NW-A Attending Mary Grace Garcia MD   Hosp Day # 4 PCP No primary care provider on file.      Chief Complaint: paraneoplastic  3. Pseudoathetosis  4. Chronic ETOH abuse  5. Nutritional deficient  6. Folate Deficiency  7. Neuropathic pain    Plan:  1. Thiamine 500 mg TID x 2 days (completed), then 250 mg IV or IM daily x 5 days, then po  2. No Narcotics  3.  Toradol [phone]

## 2017-05-17 NOTE — OCCUPATIONAL THERAPY NOTE
OCCUPATIONAL THERAPY TREATMENT NOTE - INPATIENT     Room Number: 465/948-D  Session: 1/5  Number of Visits to Meet Established Goals: 5    Presenting Problem: abd pain/distension, leukocytosis, ileus and transaminitis      History related to current admiss Transaminitis    Guillain Barré syndrome (HCC)    At risk for falling    Bilateral leg weakness    Intractable vomiting with nausea    Acute sensory neuropathy (HCC)    ETOH abuse    Neurogenic bowel    Folate deficiency    Wernicke encephalopathy      Pas noted c greater weakness on LUE versus RUE. Tolerated approx 6-7 minutes in sitting with 8/10 pain and fatigue, neurologist observing session. Pt dep A to return to supine and for repositioning. Patient End of Session: In bed; With [address] staff;Needs met;Call

## 2017-05-17 NOTE — PHYSICAL THERAPY NOTE
PHYSICAL THERAPY TREATMENT NOTE - INPATIENT    Room Number: 086/112-N     Session: 1  Number of Visits to Meet Established Goals: 5    Presenting Problem: abdominal pain    Problem List  Principal Problem:    Abdominal pain, acute  Active Problems:    Hyp need...   -   Moving to and from a bed to a chair (including a wheelchair)?: Total   -   Need to walk in hospital room?: Total   -   Climbing 3-5 steps with a railing?: Total       AM-PAC Score:  Raw Score: 7   PT Approx Degree of Impairment Score: 92.36% Discharge Recommendations: Sub-acute rehabilitation     PLAN  PT Treatment Plan: Bed mobility; Body mechanics; Endurance; Energy conservation;Patient education; Family education;Range of motion;Strengthening;Stair training;Transfer training;Balance training  R

## 2017-05-17 NOTE — PROGRESS NOTES
05/17/17 6064   Clinical Encounter Type   Visited With Patient   Referral From Patient   Referral To    Christianity Encounters   Christianity Needs Prayer   Patient Spiritual Encounters   Spiritual Needs Patient is in a lot of suffering and is seekin

## 2017-05-17 NOTE — PROGRESS NOTES
Pt tolerated IV toradol and PO gabapentin well. Pt resting comfortably. No spastic/uncontrolled movement noted. Will gena.

## 2017-05-18 ENCOUNTER — APPOINTMENT (OUTPATIENT)
Dept: ULTRASOUND IMAGING | Facility: HOSPITAL | Age: 37
DRG: 392 | End: 2017-05-18
Attending: HOSPITALIST
Payer: MEDICAID

## 2017-05-18 VITALS
WEIGHT: 179.5 LBS | RESPIRATION RATE: 18 BRPM | TEMPERATURE: 98 F | OXYGEN SATURATION: 98 % | HEIGHT: 66 IN | BODY MASS INDEX: 28.85 KG/M2 | SYSTOLIC BLOOD PRESSURE: 134 MMHG | DIASTOLIC BLOOD PRESSURE: 95 MMHG | HEART RATE: 103 BPM

## 2017-05-18 PROCEDURE — 99239 HOSP IP/OBS DSCHRG MGMT >30: CPT | Performed by: HOSPITALIST

## 2017-05-18 PROCEDURE — 93975 VASCULAR STUDY: CPT | Performed by: HOSPITALIST

## 2017-05-18 PROCEDURE — 99232 SBSQ HOSP IP/OBS MODERATE 35: CPT | Performed by: OTHER

## 2017-05-18 RX ORDER — MELATONIN
100 DAILY
Qty: 30 TABLET | Refills: 0 | Status: SHIPPED | OUTPATIENT
Start: 2017-05-18

## 2017-05-18 RX ORDER — TRAMADOL HYDROCHLORIDE 50 MG/1
50 TABLET ORAL EVERY 6 HOURS PRN
Qty: 20 TABLET | Refills: 0 | Status: SHIPPED | OUTPATIENT
Start: 2017-05-18

## 2017-05-18 RX ORDER — NICOTINE 21 MG/24HR
1 PATCH, TRANSDERMAL 24 HOURS TRANSDERMAL EVERY 24 HOURS
Qty: 10 PATCH | Refills: 0 | Status: SHIPPED | OUTPATIENT
Start: 2017-05-18

## 2017-05-18 RX ORDER — FOLIC ACID 1 MG/1
1 TABLET ORAL DAILY
Qty: 30 TABLET | Refills: 0 | Status: SHIPPED | OUTPATIENT
Start: 2017-05-18

## 2017-05-18 RX ORDER — DULOXETINE 40 MG/1
40 CAPSULE, DELAYED RELEASE ORAL DAILY
Qty: 30 CAPSULE | Refills: 0 | Status: SHIPPED | OUTPATIENT
Start: 2017-05-18 | End: 2017-06-17

## 2017-05-18 RX ORDER — GABAPENTIN 300 MG/1
900 CAPSULE ORAL 3 TIMES DAILY
Qty: 90 CAPSULE | Refills: 0 | Status: SHIPPED | OUTPATIENT
Start: 2017-05-18

## 2017-05-18 NOTE — PROGRESS NOTES
Patient seen and examined. Medically clear to discharge today. Thiamine 250 daily for 4 more days and then 100 mg daily.     Sandi Lopez MD

## 2017-05-18 NOTE — PHYSICAL THERAPY NOTE
PHYSICAL THERAPY TREATMENT NOTE - INPATIENT    Room Number: 891/241-V     Session: 2   Number of Visits to Meet Established Goals: 5    Presenting Problem: abdominal pain    Problem List  Principal Problem:    Abdominal pain, acute  Active Problems:    Hy much help from another person does the patient currently need. ..   -   Moving to and from a bed to a chair (including a wheelchair)?: Total   -   Need to walk in hospital room?: Total   -   Climbing 3-5 steps with a railing?: Total       AM-PAC Score:  Raw with sitting EOB. Pt will benefit from ongoing PT to continue to improve strength, endurance, coordination, balance, transfers, and progress to OOB mobility once able.      DISCHARGE RECOMMENDATIONS  PT Discharge Recommendations: Sub-acute rehabilitation

## 2017-05-18 NOTE — SLP NOTE
SPEECH DAILY NOTE - INPATIENT    Evaluation Date: 05/18/2017    ASSESSMENT & PLAN   ASSESSMENT  Pt seenf for continued dysphagia tx to assess for diet tolerance/safety, r/o asp risk and ensure that pt is f/u with asp precautions with all meals.   Pt is on a

## 2017-05-18 NOTE — PLAN OF CARE
PT D/C TO [facility]. DISCHARGE PAPER WORK INCLUDING PRESCRIPTIONS GIVEN TO AMBULANCE STAFF. VERBAL REPORT GIVEN TO RECEIVING RN AT [facility]. PT D/C IN STABLE CONDITION WITH IV.

## 2017-05-18 NOTE — PROGRESS NOTES
[id number] Sarika Garcia Neurology Progress Note    Yancy Ruiz Patient Status:  Inpatient    1980 MRN [id number]   [facility] 3NW-A Attending Royal Mariana MD   Hosp Day # 5 PCP No primary care provider on file.      CC: Leg weakness imaging    Assessment/Plan:  · Acute sensory greater than motor sensory neuropathy; DDx includes acute nutritional neuropathy vs AMSAN variant GBS vs Immune or paraneoplastic  · Completed second round of IVIG  · PT/OT recommends TAD; SW on case  · VC 2.96 consistent with a motor and sensory neuropathy, no demyelinating changes  CSF pro 65, WBC 0    Assessment:  Acute sensory greater than motor sensory neuropathy= acute nutritional neuropathy vs. Less likely immune related neuropathy  Pseudoathetosis  Chroni

## 2017-05-18 NOTE — CM/SW NOTE
Patient cleared for transfer back to [facility] today at [address] via [address] AVS to be faxed when completed. Medicaid form for non-emergency transport faxed to [facility]. RN, patient and family updated on POC.

## 2017-05-18 NOTE — OCCUPATIONAL THERAPY NOTE
OCCUPATIONAL THERAPY TREATMENT NOTE - INPATIENT     Room Number: 094/722-Y  Session: 2/5  Number of Visits to Meet Established Goals: 5    Presenting Problem: abd pain/distension, leukocytosis, ileus and transaminitis     History related to current admissi Transaminitis    Guillain Barré syndrome (HCC)    At risk for falling    Bilateral leg weakness    Intractable vomiting with nausea    Acute sensory neuropathy (HCC)    ETOH abuse    Neurogenic bowel    Folate deficiency    Wernicke encephalopathy    Danny supine to re-position bed sheets. Pt demos improved accuracy with UE placement however still needs assist to guide/stabilize R>L. Patient End of Session: In bed;Needs met;Call light within reach;RN aware of session/findings; All patient questions and conc

## 2017-05-20 NOTE — DISCHARGE SUMMARY
[facility] HOSPITALIST  DISCHARGE SUMMARY     Koki Wood Patient Status:  Inpatient    1980 MRN [id number]   [facility] 3NW-A Attending No att. providers found   Hosp Day # 5 PCP No primary care provider on file.      Date of Admission not notice significant improvement in his symptoms with treatment. He also was told that he had a small bowel obstruction and was kept NPO. He says that a rectal tube was placed.  He was tolerating PO intake and had flatus and stool in his rectal tube prior 40 mg on 5/18/2017  9:35 AM        Take 40 mg by mouth daily.     Stop taking on:  6/17/2017   Quantity:  30 capsule   Refills:  0       folic acid 1 MG Tabs   Last time this was given:  1 mg on 5/17/2017  9:42 AM   Commonly known as:  FOLVITE        Take 1 [facility], [address] [place] [address], [phone]  [address] [place] [address], [address] [facility], [address] [place] [address] Drive     Phone:  [phone]    - Thiamine HCl 100 MG Tabs      Please  your prescriptions at the location directed by your doctor or nurse     Bring a paper

## 2017-05-22 ENCOUNTER — LAB ENCOUNTER (OUTPATIENT)
Dept: LAB | Age: 37
End: 2017-05-22
Attending: FAMILY MEDICINE
Payer: MEDICAID

## 2017-05-22 ENCOUNTER — SNF ADMIT/H&P (OUTPATIENT)
Dept: FAMILY MEDICINE CLINIC | Facility: CLINIC | Age: 37
End: 2017-05-22

## 2017-05-22 DIAGNOSIS — K59.2 NEUROGENIC BOWEL: ICD-10-CM

## 2017-05-22 DIAGNOSIS — E53.8 FOLATE DEFICIENCY: ICD-10-CM

## 2017-05-22 DIAGNOSIS — R29.898 BILATERAL LEG WEAKNESS: ICD-10-CM

## 2017-05-22 DIAGNOSIS — G61.0 GUILLAIN BARRÉ SYNDROME (HCC): Primary | ICD-10-CM

## 2017-05-22 DIAGNOSIS — Z91.81 AT RISK FOR FALLING: ICD-10-CM

## 2017-05-22 DIAGNOSIS — D64.9 ANEMIA, UNSPECIFIED TYPE: ICD-10-CM

## 2017-05-22 DIAGNOSIS — E51.9 THIAMINE DEFICIENCY: ICD-10-CM

## 2017-05-22 DIAGNOSIS — N17.9 ACUTE KIDNEY INJURY (HCC): ICD-10-CM

## 2017-05-22 DIAGNOSIS — G60.8 ACUTE SENSORY NEUROPATHY: ICD-10-CM

## 2017-05-22 DIAGNOSIS — F10.10 ETOH ABUSE: ICD-10-CM

## 2017-05-22 DIAGNOSIS — E51.2 WERNICKE ENCEPHALOPATHY: Primary | ICD-10-CM

## 2017-05-22 DIAGNOSIS — G61.0 GUILLAIN BARRÉ SYNDROME (HCC): ICD-10-CM

## 2017-05-22 PROCEDURE — 99306 1ST NF CARE HIGH MDM 50: CPT | Performed by: FAMILY MEDICINE

## 2017-05-22 PROCEDURE — 80053 COMPREHEN METABOLIC PANEL: CPT

## 2017-05-22 PROCEDURE — 85025 COMPLETE CBC W/AUTO DIFF WBC: CPT

## 2017-05-22 PROCEDURE — 36415 COLL VENOUS BLD VENIPUNCTURE: CPT

## 2017-05-22 NOTE — PROGRESS NOTES
[facility] Author: MD INÉS Leonard 1980 MRN [id number]   [facility]  Admission 17      Last Hospital Discharge 17 PCP No primary care provider on file.    Hospital of Discharge 17       Date it does have a significant history of EtOH abuse and dependence and some of symptoms likely related to Etoh dependence. Patient was recently started on Cymbalta and Neurontin with some overall improvement in symptoms.   Patient has no new complaints at th times per week. His alcohol history is not on file. ROS:   A comprehensive 14 point review of systems was completed.     Pertinent positives and negatives noted in the HPI.       PHYSICAL EXAM:   Estimated body mass index is 28.99 kg/(m^2) as calculated OBSTRUCTIVE SERIES  TECHNIQUE:  Supine and upright views of the abdomen and pelvis were obtained. COMPARISON:  [facility] , XR ABDOMEN, OBSTRUCTIVE SERIES (CPT=74020), 5/15/2017, 11:16.   INDICATIONS:  abdominal distention  PATIENT STATED HISTORY: (As transcri PAIN/VOMITING  TECHNIQUE:  Real time gray-scale ultrasound was used to evaluate the abdomen. The exam includes images of the liver, gallbladder, common bile duct, pancreas, spleen, kidneys, IVC, and aorta.   FINDINGS:  LIVER:  Diffuse fatty infiltration of inhomogeneous attenuation. Odd Darrell BILIARY:  Distended gallbladder. No delayed ductal dilatation PANCREAS:  Homogeneous enhancement. SPLEEN:  Normal caliber. KIDNEYS:  No hydronephrosis or focal renal mass. ADRENALS:  Normal. AORTA/VASCULAR:  No aneurysm.  Donalynn Lands MD on 5/18/2017 at 9:23     Approved by: Hamlet Figueroa MD                Recent Results (from the past 72 hour(s))  -COMP METABOLIC PANEL (14)   Collection Time: 05/22/17  6:25 AM   Result Value Ref Range   Glucose 75 70-99 mg/dL   BUN 8 8-20 mg/dL Acute sensory neuropathy (HCC)  -IVIG completed, oral vitamins, continue with gabapentin. 4. Neurogenic bowel  -stable, CPM    5. Folate deficiency  -stable, CPM    6. Thiamine deficiency  -stable, CPM, continue with vitamins.      7. Bilateral leg weak

## 2017-05-24 NOTE — PROCEDURES
This test includes spirometry and flow volume loop done at the bedside during an inpatient hospitalization. Spirometry and flow volume loop appear normal with no evidence of airway obstruction or restriction.

## 2017-05-25 ENCOUNTER — LAB ENCOUNTER (OUTPATIENT)
Dept: LAB | Age: 37
End: 2017-05-25
Attending: FAMILY MEDICINE
Payer: MEDICAID

## 2017-05-25 DIAGNOSIS — D64.9 ANEMIA: Primary | ICD-10-CM

## 2017-05-25 PROCEDURE — 80053 COMPREHEN METABOLIC PANEL: CPT

## 2017-05-25 PROCEDURE — 85025 COMPLETE CBC W/AUTO DIFF WBC: CPT

## 2017-05-25 PROCEDURE — 36415 COLL VENOUS BLD VENIPUNCTURE: CPT

## 2017-06-01 ENCOUNTER — LAB ENCOUNTER (OUTPATIENT)
Dept: LAB | Age: 37
End: 2017-06-01
Attending: FAMILY MEDICINE
Payer: MEDICAID

## 2017-06-01 DIAGNOSIS — G61.0 GUILLAIN BARRÉ SYNDROME (HCC): Primary | ICD-10-CM

## 2017-06-01 PROCEDURE — 36415 COLL VENOUS BLD VENIPUNCTURE: CPT

## 2017-06-01 PROCEDURE — 85025 COMPLETE CBC W/AUTO DIFF WBC: CPT

## 2017-06-01 PROCEDURE — 80053 COMPREHEN METABOLIC PANEL: CPT

## 2017-06-08 ENCOUNTER — LAB ENCOUNTER (OUTPATIENT)
Dept: LAB | Age: 37
End: 2017-06-08
Attending: FAMILY MEDICINE
Payer: MEDICAID

## 2017-06-08 DIAGNOSIS — E51.9 THIAMIN DEFICIENCY: Primary | ICD-10-CM

## 2017-06-08 PROCEDURE — 36415 COLL VENOUS BLD VENIPUNCTURE: CPT

## 2017-06-08 PROCEDURE — 85025 COMPLETE CBC W/AUTO DIFF WBC: CPT

## 2017-06-08 PROCEDURE — 80053 COMPREHEN METABOLIC PANEL: CPT

## 2017-06-09 ENCOUNTER — SNF DISCHARGE (OUTPATIENT)
Dept: FAMILY MEDICINE CLINIC | Facility: CLINIC | Age: 37
End: 2017-06-09

## 2017-06-09 DIAGNOSIS — G61.0 GUILLAIN BARRÉ SYNDROME (HCC): Primary | ICD-10-CM

## 2017-06-09 DIAGNOSIS — E51.9 THIAMINE DEFICIENCY: ICD-10-CM

## 2017-06-09 DIAGNOSIS — R29.898 BILATERAL LEG WEAKNESS: ICD-10-CM

## 2017-06-09 DIAGNOSIS — I10 ESSENTIAL HYPERTENSION: ICD-10-CM

## 2017-06-09 DIAGNOSIS — E51.2 WERNICKE ENCEPHALOPATHY: ICD-10-CM

## 2017-06-09 DIAGNOSIS — E53.8 FOLATE DEFICIENCY: ICD-10-CM

## 2017-06-09 DIAGNOSIS — G60.8 ACUTE SENSORY NEUROPATHY: ICD-10-CM

## 2017-06-09 DIAGNOSIS — F10.10 ETOH ABUSE: ICD-10-CM

## 2017-06-09 DIAGNOSIS — Z91.81 AT RISK FOR FALLING: ICD-10-CM

## 2017-06-09 PROCEDURE — 99316 NF DSCHRG MGMT 30 MIN+: CPT | Performed by: FAMILY MEDICINE

## 2017-06-09 NOTE — PROGRESS NOTES
[facility] Discharge Summary      [facility] Author: Víctor Montoya MD     1980 MRN [id number]   [facility]  Admission 17      Last Hospital Discharge 17 PCP No primary care provider on file.    Hospital of Discharge  with [facility] was treated with IVIG ×5. Patient has minimal improvement with it does have a significant history of EtOH abuse and dependence and some of symptoms likely related to Etoh dependence.    Patient was recently started on Cymbalta and Neuron sodium chloride 0.9 % SOLN 50 mL with Thiamine HCl 100 MG/ML SOLN 250 mg Inject 250 mg into the vein daily. nicotine 21 MG/24HR Transdermal Patch 24 Hr Place 1 patch onto the skin daily.    Thiamine HCl 100 MG Oral Tab Take 1 tablet (100 mg total) by mo normal and breath sounds normal. No stridor. No respiratory distress. He has no rales. He exhibits no tenderness. Abdominal: Soft. Bowel sounds are normal. He exhibits no distension and no mass. There is no tenderness.  There is no rebound and no guarding ABDOMEN+PELVIS(CONTRAST ONLY)(CPT=74177), 5/13/2017, 19:30. MAX , US ABDOMEN COMPLETE (CPT=76700), 5/13/2017, 21:45.   INDICATIONS:  abdominal distention  PATIENT STATED HISTORY: (As transcribed by Technologist)  The patient states that he has abdominal Only)(cpt=74177)    5/13/2017  PROCEDURE:  CT ABDOMEN+PELVIS(CONTRAST ONLY)(CPT=74177)  COMPARISON:  None.   INDICATIONS:  ABD PAIN/VOMITING  TECHNIQUE:  CT scanning was performed from the dome of the diaphragm to the pubic symphysis with non-ionic intraven noncalcified right lower lobe pulmonary nodule   Dictated by: Steven Andujar MD on 5/13/2017 at 20:20     Approved by: Steven Andujar MD            Us Abdomen Doppler Only (cpt=93975)    5/18/2017  PROCEDURE:  US ABDOMEN DOPPLER ONLY (CPT=93975)  INDICATIONS RDW-SD 45.9 35.1-46.3 fL   Neutrophil Absolute Prelim 8.25 (H) 1.30-6.70 x10 (3) uL   Neutrophil Absolute 8.25 (H) 1.30-6.70 x10(3) uL   Lymphocyte Absolute 1.46 0.90-4.00 x10(3) uL   Monocyte Absolute 0.73 (H) 0.10-0.60 x10(3) uL   Eosinophil Absolute 0 Leticia Huber MD

## 2017-06-12 ENCOUNTER — TELEPHONE (OUTPATIENT)
Dept: FAMILY MEDICINE CLINIC | Facility: CLINIC | Age: 37
End: 2017-06-12

## 2017-06-12 DIAGNOSIS — K59.2 NEUROGENIC BOWEL: ICD-10-CM

## 2017-06-12 DIAGNOSIS — F10.10 ETOH ABUSE: ICD-10-CM

## 2017-06-12 DIAGNOSIS — R29.898 BILATERAL LEG WEAKNESS: ICD-10-CM

## 2017-06-12 DIAGNOSIS — E53.8 FOLATE DEFICIENCY: ICD-10-CM

## 2017-06-12 DIAGNOSIS — G60.8 ACUTE SENSORY NEUROPATHY: ICD-10-CM

## 2017-06-12 DIAGNOSIS — E51.2 WERNICKE ENCEPHALOPATHY: Primary | ICD-10-CM

## 2017-06-12 DIAGNOSIS — E51.9 THIAMINE DEFICIENCY: ICD-10-CM

## 2017-06-12 DIAGNOSIS — Z91.81 AT RISK FOR FALLING: ICD-10-CM

## 2017-06-13 ENCOUNTER — LAB ENCOUNTER (OUTPATIENT)
Dept: LAB | Age: 37
End: 2017-06-13
Attending: FAMILY MEDICINE
Payer: MEDICAID

## 2017-06-13 ENCOUNTER — SNF VISIT (OUTPATIENT)
Dept: FAMILY MEDICINE CLINIC | Facility: CLINIC | Age: 37
End: 2017-06-13

## 2017-06-13 DIAGNOSIS — E53.8 FOLATE DEFICIENCY: ICD-10-CM

## 2017-06-13 DIAGNOSIS — E51.9 THIAMINE DEFICIENCY: Primary | ICD-10-CM

## 2017-06-13 DIAGNOSIS — Z91.81 AT RISK FOR FALLING: ICD-10-CM

## 2017-06-13 DIAGNOSIS — E51.2 WERNICKE ENCEPHALOPATHY: ICD-10-CM

## 2017-06-13 DIAGNOSIS — K59.2 NEUROGENIC BOWEL: ICD-10-CM

## 2017-06-13 DIAGNOSIS — F10.10 ETOH ABUSE: ICD-10-CM

## 2017-06-13 DIAGNOSIS — R29.898 BILATERAL LEG WEAKNESS: ICD-10-CM

## 2017-06-13 DIAGNOSIS — I15.8 OTHER SECONDARY HYPERTENSION: ICD-10-CM

## 2017-06-13 DIAGNOSIS — R69 DIAGNOSIS UNKNOWN: Primary | ICD-10-CM

## 2017-06-13 DIAGNOSIS — G60.8 ACUTE SENSORY NEUROPATHY: ICD-10-CM

## 2017-06-13 DIAGNOSIS — G61.0 GUILLAIN BARRÉ SYNDROME (HCC): ICD-10-CM

## 2017-06-13 DIAGNOSIS — R74.01 TRANSAMINITIS: ICD-10-CM

## 2017-06-13 PROCEDURE — 80307 DRUG TEST PRSMV CHEM ANLYZR: CPT

## 2017-06-13 PROCEDURE — 99309 SBSQ NF CARE MODERATE MDM 30: CPT | Performed by: FAMILY MEDICINE

## 2017-06-13 PROCEDURE — 80349 CANNABINOIDS NATURAL: CPT

## 2017-06-13 NOTE — PROGRESS NOTES
[facility] Discharge Summary      Naomia Aase Author: Daron Sofia MD     1980 MRN [id number]   [facility]  Admission 17      Last Hospital Discharge 17 PCP No primary care provider on file.    Hospital of Discharge  minimal improvement with it does have a significant history of EtOH abuse and dependence and some of symptoms likely related to Etoh dependence. Patient was recently started on Cymbalta and Neurontin with some overall improvement in symptoms.   Patient ha night after he went outside the facility and had drug screen done which was positive for THC in urine. Patient denies any shortness of breath, denies chest pain and denies any recent fevers or chills.   Patient reports no urinary complaints and denies hea in the HPI. PHYSICAL EXAM:   Estimated body mass index is 28.99 kg/(m^2) as calculated from the following:    Height as of 5/13/17: 66\". Weight as of 5/13/17: 179 lb 8 oz.        Physical Exam   Constitutional: He is oriented to person, place, and 5/15/2017, 11:16. INDICATIONS:  abdominal distention  PATIENT STATED HISTORY: (As transcribed by Technologist)  Abdominal distention. Patient offered no additional history.     FINDINGS:  There is interval improvement in small bowel dilatation since prior spleen, kidneys, IVC, and aorta. FINDINGS:  LIVER:  Diffuse fatty infiltration of the liver BILIARY:  The gallbladder is distended. No obvious cholelithiasis. Gallbladder wall thickness measures 2 mm. No intrahepatic or common bile duct dilatation.  Common hydronephrosis or focal renal mass. ADRENALS:  Normal. AORTA/VASCULAR:  No aneurysm. RETROPERITONEUM:  No enlarged adenopathy. BOWEL/MESENTERY:  Appendectomy. Uncomplicated colonic diverticulosis.  Fluid filled normal caliber small bowel loops may be seen w Collection Time: 06/13/17  8:55 AM   Result Value Ref Range   Amphetamine Urine Negative Negative   Benzodiazepines Urine Negative Negative   Cocaine Urine Negative Negative   PCP Urine Negative Negative   Barbiturates Urine Negative Negative   Cannabino question. -stable, CPM    10. Acute kidney injury ([facility] [address]  -watching not at goal, stable. CPM    11. Anemia, unspecified type  -watching labs.  Stable, CPM.     April Martin needs then following services:  Occupational Therapy  Physical Therapy  Respira

## 2017-06-14 ENCOUNTER — LAB ENCOUNTER (OUTPATIENT)
Dept: LAB | Age: 37
End: 2017-06-14
Attending: FAMILY MEDICINE
Payer: MEDICAID

## 2017-06-14 DIAGNOSIS — G61.0 ACUTE INFECTIVE POLYNEURITIS (HCC): Primary | ICD-10-CM

## 2017-06-14 PROCEDURE — 36415 COLL VENOUS BLD VENIPUNCTURE: CPT

## 2017-06-14 PROCEDURE — 80053 COMPREHEN METABOLIC PANEL: CPT

## 2017-06-14 PROCEDURE — 85025 COMPLETE CBC W/AUTO DIFF WBC: CPT

## 2017-06-15 ENCOUNTER — HOSPITAL ENCOUNTER (EMERGENCY)
Facility: HOSPITAL | Age: 37
Discharge: STILL ADMITTED OR ADMINISTRATIVE | End: 2017-06-16
Payer: MEDICAID

## 2018-02-02 ENCOUNTER — CHARTING TRANS (OUTPATIENT)
Dept: OTHER | Age: 38
End: 2018-02-02

## 2018-09-17 ENCOUNTER — CHARTING TRANS (OUTPATIENT)
Dept: OTHER | Age: 38
End: 2018-09-17

## 2018-09-22 ENCOUNTER — CHARTING TRANS (OUTPATIENT)
Dept: OTHER | Age: 38
End: 2018-09-22

## 2018-11-23 ENCOUNTER — IMAGING SERVICES (OUTPATIENT)
Dept: OTHER | Age: 38
End: 2018-11-23

## 2018-11-29 ENCOUNTER — CHARTING TRANS (OUTPATIENT)
Dept: OTHER | Age: 38
End: 2018-11-29

## 2019-01-24 ENCOUNTER — IMAGING SERVICES (OUTPATIENT)
Dept: OTHER | Age: 39
End: 2019-01-24

## (undated) NOTE — IP AVS SNAPSHOT
[facility] [place] [address] [place], [address] ~ [phone]                Discharge Summary   5/13/2017 April Martin           Admission Information        Provider Department    5/13/2017 Tracy Tejeda MD  3nw-A [facility]                           Thiamine HCl 100 MG Tabs        Take 1 tablet (100 mg total) by mouth daily.     [facility]                           TraMADol HCl 50 MG Tabs   Last time this was given:  50 mg on 5/18/2017  3:12 PM   Commonly k mg daily indefinitely along with folate. Follow-up Information     Follow up with Tony Mercer DO.     Specialty:  NEUROLOGY    Why:  Call on day of discharge for appointment in 2-3, let office know doctor said 2-3 weeks    Contact information: 91 (05/18/17)  11 (05/18/17)  0.46 (L) (05/18/17)  8.7 (05/18/17)  213 (H) (05/18/17)  078 (H)      Metabolic Lab Results  (Last result in the past 90 days)    ALT Bilirubin,Total Total Protein Albumin Sodium Potassium Chloride    (05/18/17)  51 (05/18/17) Enter your [employer] Activation Code exactly as it appears below along with your Zip Code and Date of Birth to complete the sign-up process. If you do not sign up before the expiration date, you must request a new code.     Your unique [employer] Access Code: MG nausea/vomiting, somnolence   What to report to your healthcare team: Dizziness, Somnolence, Weakness, Headache, Nausea/vomiting           Mood and Thought Medications     DULoxetine HCl 40 MG Oral Cap DR Particles    nicotine 21 MG/24HR Transdermal Patch